# Patient Record
Sex: MALE | Race: WHITE | NOT HISPANIC OR LATINO | Employment: FULL TIME | URBAN - METROPOLITAN AREA
[De-identification: names, ages, dates, MRNs, and addresses within clinical notes are randomized per-mention and may not be internally consistent; named-entity substitution may affect disease eponyms.]

---

## 2020-11-17 ENCOUNTER — TELEPHONE (OUTPATIENT)
Dept: UROLOGY | Facility: CLINIC | Age: 65
End: 2020-11-17

## 2020-11-17 NOTE — TELEPHONE ENCOUNTER
----- Message from Karen Garcia sent at 11/17/2020  2:55 PM CST -----  Regarding: appt access  Type:  Sooner Apoointment Request    Caller is requesting a sooner appointment.  Caller declined first available appointment listed below.  Caller will not accept being placed on the waitlist and is requesting a message be sent to doctor.  Name of Caller:pt  When is the first available appointment?n/a  Symptoms:Check up  Would the patient rather a call back or a response via [facility]? Call back   Best Call Back Number:[phone]  Additional Information: Pt is looking to be scheduled in March. Please call back.Thanks

## 2021-02-04 ENCOUNTER — OFFICE VISIT (OUTPATIENT)
Dept: UROLOGY | Facility: CLINIC | Age: 66
End: 2021-02-04
Payer: COMMERCIAL

## 2021-02-04 ENCOUNTER — LAB VISIT (OUTPATIENT)
Dept: LAB | Facility: HOSPITAL | Age: 66
End: 2021-02-04
Attending: UROLOGY
Payer: COMMERCIAL

## 2021-02-04 VITALS
HEIGHT: 72 IN | BODY MASS INDEX: 31.92 KG/M2 | DIASTOLIC BLOOD PRESSURE: 86 MMHG | WEIGHT: 235.69 LBS | SYSTOLIC BLOOD PRESSURE: 126 MMHG

## 2021-02-04 DIAGNOSIS — N13.8 BPH WITH OBSTRUCTION/LOWER URINARY TRACT SYMPTOMS: Primary | ICD-10-CM

## 2021-02-04 DIAGNOSIS — N40.1 BPH WITH OBSTRUCTION/LOWER URINARY TRACT SYMPTOMS: ICD-10-CM

## 2021-02-04 DIAGNOSIS — N40.1 BPH WITH OBSTRUCTION/LOWER URINARY TRACT SYMPTOMS: Primary | ICD-10-CM

## 2021-02-04 DIAGNOSIS — N13.8 BPH WITH OBSTRUCTION/LOWER URINARY TRACT SYMPTOMS: ICD-10-CM

## 2021-02-04 PROBLEM — E78.5 HYPERLIPIDEMIA: Status: ACTIVE | Noted: 2021-02-04

## 2021-02-04 PROBLEM — J30.9 ALLERGIC RHINITIS: Status: ACTIVE | Noted: 2017-11-12

## 2021-02-04 PROBLEM — N20.0 KIDNEY STONE: Status: ACTIVE | Noted: 2021-02-04

## 2021-02-04 PROBLEM — J01.90 SINUSITIS, ACUTE: Status: ACTIVE | Noted: 2018-03-16

## 2021-02-04 PROBLEM — G43.109 MIGRAINE AURA WITHOUT HEADACHE: Status: ACTIVE | Noted: 2021-02-04

## 2021-02-04 PROBLEM — J06.9 URI, ACUTE: Status: ACTIVE | Noted: 2019-11-26

## 2021-02-04 PROBLEM — R31.9 HEMATURIA, UNSPECIFIED: Status: ACTIVE | Noted: 2020-09-14

## 2021-02-04 PROBLEM — I10 ESSENTIAL HYPERTENSION: Status: ACTIVE | Noted: 2019-07-30

## 2021-02-04 PROBLEM — G47.00 INSOMNIA: Status: ACTIVE | Noted: 2021-02-04

## 2021-02-04 PROBLEM — R10.9 ABDOMINAL PAIN: Status: ACTIVE | Noted: 2020-09-14

## 2021-02-04 LAB
BILIRUB SERPL-MCNC: NORMAL MG/DL
BLOOD URINE, POC: NORMAL
CLARITY, POC UA: CLEAR
COLOR, POC UA: YELLOW
GLUCOSE UR QL STRIP: NORMAL
KETONES UR QL STRIP: NORMAL
LEUKOCYTE ESTERASE URINE, POC: NORMAL
NITRITE, POC UA: NORMAL
PH, POC UA: 7
PROTEIN, POC: NORMAL
SPECIFIC GRAVITY, POC UA: 1.01
UROBILINOGEN, POC UA: NORMAL

## 2021-02-04 PROCEDURE — 1159F MED LIST DOCD IN RCRD: CPT | Mod: S$GLB,,, | Performed by: UROLOGY

## 2021-02-04 PROCEDURE — 36415 COLL VENOUS BLD VENIPUNCTURE: CPT | Mod: PO

## 2021-02-04 PROCEDURE — 3008F PR BODY MASS INDEX (BMI) DOCUMENTED: ICD-10-PCS | Mod: CPTII,S$GLB,, | Performed by: UROLOGY

## 2021-02-04 PROCEDURE — 99999 PR PBB SHADOW E&M-EST. PATIENT-LVL III: ICD-10-PCS | Mod: PBBFAC,,, | Performed by: UROLOGY

## 2021-02-04 PROCEDURE — 99212 OFFICE O/P EST SF 10 MIN: CPT | Mod: 25,S$GLB,, | Performed by: UROLOGY

## 2021-02-04 PROCEDURE — 1101F PR PT FALLS ASSESS DOC 0-1 FALLS W/OUT INJ PAST YR: ICD-10-PCS | Mod: CPTII,S$GLB,, | Performed by: UROLOGY

## 2021-02-04 PROCEDURE — 81002 POCT URINE DIPSTICK WITHOUT MICROSCOPE: ICD-10-PCS | Mod: S$GLB,,, | Performed by: UROLOGY

## 2021-02-04 PROCEDURE — 1101F PT FALLS ASSESS-DOCD LE1/YR: CPT | Mod: CPTII,S$GLB,, | Performed by: UROLOGY

## 2021-02-04 PROCEDURE — 1159F PR MEDICATION LIST DOCUMENTED IN MEDICAL RECORD: ICD-10-PCS | Mod: S$GLB,,, | Performed by: UROLOGY

## 2021-02-04 PROCEDURE — 3288F FALL RISK ASSESSMENT DOCD: CPT | Mod: CPTII,S$GLB,, | Performed by: UROLOGY

## 2021-02-04 PROCEDURE — 84153 ASSAY OF PSA TOTAL: CPT

## 2021-02-04 PROCEDURE — 3008F BODY MASS INDEX DOCD: CPT | Mod: CPTII,S$GLB,, | Performed by: UROLOGY

## 2021-02-04 PROCEDURE — 99999 PR PBB SHADOW E&M-EST. PATIENT-LVL III: CPT | Mod: PBBFAC,,, | Performed by: UROLOGY

## 2021-02-04 PROCEDURE — 99212 PR OFFICE/OUTPT VISIT, EST, LEVL II, 10-19 MIN: ICD-10-PCS | Mod: 25,S$GLB,, | Performed by: UROLOGY

## 2021-02-04 PROCEDURE — 3288F PR FALLS RISK ASSESSMENT DOCUMENTED: ICD-10-PCS | Mod: CPTII,S$GLB,, | Performed by: UROLOGY

## 2021-02-04 PROCEDURE — 81002 URINALYSIS NONAUTO W/O SCOPE: CPT | Mod: S$GLB,,, | Performed by: UROLOGY

## 2021-02-04 RX ORDER — ZOLPIDEM TARTRATE 10 MG/1
10 TABLET ORAL
COMMUNITY
Start: 2020-08-31

## 2021-02-04 RX ORDER — ASPIRIN 81 MG/1
81 TABLET ORAL
COMMUNITY

## 2021-02-04 RX ORDER — FENOFIBRATE 54 MG/1
TABLET ORAL
COMMUNITY
Start: 2021-01-12

## 2021-02-04 RX ORDER — LOSARTAN POTASSIUM AND HYDROCHLOROTHIAZIDE 12.5; 5 MG/1; MG/1
TABLET ORAL
COMMUNITY
Start: 2021-01-26

## 2021-02-04 RX ORDER — ROSUVASTATIN CALCIUM 20 MG/1
TABLET, COATED ORAL
COMMUNITY
Start: 2021-01-12

## 2021-02-04 RX ORDER — TESTOSTERONE 20.25 MG/1.25G
GEL TOPICAL
COMMUNITY
Start: 2020-12-24

## 2021-02-04 RX ORDER — CLOBETASOL PROPIONATE 0.46 MG/ML
SOLUTION TOPICAL
COMMUNITY
Start: 2021-01-04

## 2021-02-05 LAB — COMPLEXED PSA SERPL-MCNC: 0.51 NG/ML (ref 0–4)

## 2021-04-28 ENCOUNTER — PATIENT MESSAGE (OUTPATIENT)
Dept: RESEARCH | Facility: HOSPITAL | Age: 66
End: 2021-04-28

## 2021-05-10 PROBLEM — J01.90 SINUSITIS, ACUTE: Status: RESOLVED | Noted: 2018-03-16 | Resolved: 2021-05-10

## 2022-09-20 ENCOUNTER — OFFICE VISIT (OUTPATIENT)
Dept: UROLOGY | Facility: CLINIC | Age: 67
End: 2022-09-20
Payer: COMMERCIAL

## 2022-09-20 ENCOUNTER — LAB VISIT (OUTPATIENT)
Dept: LAB | Facility: HOSPITAL | Age: 67
End: 2022-09-20
Attending: UROLOGY
Payer: COMMERCIAL

## 2022-09-20 VITALS
DIASTOLIC BLOOD PRESSURE: 75 MMHG | SYSTOLIC BLOOD PRESSURE: 139 MMHG | WEIGHT: 237.88 LBS | BODY MASS INDEX: 32.26 KG/M2

## 2022-09-20 DIAGNOSIS — Z12.5 PROSTATE CANCER SCREENING: ICD-10-CM

## 2022-09-20 DIAGNOSIS — N13.8 BPH WITH OBSTRUCTION/LOWER URINARY TRACT SYMPTOMS: Primary | ICD-10-CM

## 2022-09-20 DIAGNOSIS — N40.1 BPH WITH OBSTRUCTION/LOWER URINARY TRACT SYMPTOMS: Primary | ICD-10-CM

## 2022-09-20 PROCEDURE — 36415 COLL VENOUS BLD VENIPUNCTURE: CPT | Mod: PN | Performed by: UROLOGY

## 2022-09-20 PROCEDURE — 1159F MED LIST DOCD IN RCRD: CPT | Mod: CPTII,S$GLB,, | Performed by: UROLOGY

## 2022-09-20 PROCEDURE — 99213 OFFICE O/P EST LOW 20 MIN: CPT | Mod: S$GLB,,, | Performed by: UROLOGY

## 2022-09-20 PROCEDURE — 3075F SYST BP GE 130 - 139MM HG: CPT | Mod: CPTII,S$GLB,, | Performed by: UROLOGY

## 2022-09-20 PROCEDURE — 1159F PR MEDICATION LIST DOCUMENTED IN MEDICAL RECORD: ICD-10-PCS | Mod: CPTII,S$GLB,, | Performed by: UROLOGY

## 2022-09-20 PROCEDURE — 99213 PR OFFICE/OUTPT VISIT, EST, LEVL III, 20-29 MIN: ICD-10-PCS | Mod: S$GLB,,, | Performed by: UROLOGY

## 2022-09-20 PROCEDURE — 3078F DIAST BP <80 MM HG: CPT | Mod: CPTII,S$GLB,, | Performed by: UROLOGY

## 2022-09-20 PROCEDURE — 3075F PR MOST RECENT SYSTOLIC BLOOD PRESS GE 130-139MM HG: ICD-10-PCS | Mod: CPTII,S$GLB,, | Performed by: UROLOGY

## 2022-09-20 PROCEDURE — 3008F PR BODY MASS INDEX (BMI) DOCUMENTED: ICD-10-PCS | Mod: CPTII,S$GLB,, | Performed by: UROLOGY

## 2022-09-20 PROCEDURE — 3078F PR MOST RECENT DIASTOLIC BLOOD PRESSURE < 80 MM HG: ICD-10-PCS | Mod: CPTII,S$GLB,, | Performed by: UROLOGY

## 2022-09-20 PROCEDURE — 84153 ASSAY OF PSA TOTAL: CPT | Performed by: UROLOGY

## 2022-09-20 PROCEDURE — 99999 PR PBB SHADOW E&M-EST. PATIENT-LVL III: CPT | Mod: PBBFAC,,, | Performed by: UROLOGY

## 2022-09-20 PROCEDURE — 3008F BODY MASS INDEX DOCD: CPT | Mod: CPTII,S$GLB,, | Performed by: UROLOGY

## 2022-09-20 PROCEDURE — 99999 PR PBB SHADOW E&M-EST. PATIENT-LVL III: ICD-10-PCS | Mod: PBBFAC,,, | Performed by: UROLOGY

## 2022-09-20 RX ORDER — TAMSULOSIN HYDROCHLORIDE 0.4 MG/1
0.4 CAPSULE ORAL DAILY
Qty: 30 CAPSULE | Refills: 11 | Status: SHIPPED | OUTPATIENT
Start: 2022-09-20 | End: 2022-11-29

## 2022-09-20 NOTE — PROGRESS NOTES
Chief Complaint   Patient presents with    Annual Exam         History of Present Illness:     9/20/22-has more frequency/urgency. Previously was on flomax, and it did help. No gross hematuria or dysuria. +weak stream.   2/4/21-Pt here for follow up. Never took cialis. Not having any urinary issues. No incontinence. Stream is not particularly good. If he drinks beer in the evening, he has nocturia, but switched to bourbon and denies nocturia. Has a little stranguria when he sits to urinate, better stream when he is standing.     Past  history:   BPH and urinary urgency  Flomax caused sexual side effects  Prescribed daily cialis 2/17/20    Review of Systems:   Constitutional: Pt denies fever, chills, change in appetite or unintentional weight loss  HENT: No drooling, loss of hearing, mouth sores, facial swelling  Eyes: No photophobia, visual disturbance or eye pain  Respiratory: No cough, dyspnea, wheezing  Cardiovascular: No chest pain, palpitations or leg swelling  GI: No constipation, diarrhea, nausea, vomiting, melena or hematochezia  : No genital lesions, discharge, nocturnal enuresis  Endocrine: No polydipsia, polyphagia, heat or cold intolerance  Musculoskeletal: No joint swelling, back pain or bone pain  Integument: No color change, rash or wounds  Neurological: No seizures, speech difficulty or tremors  Psychiatric: No confusion, self-harm or Hallucinations    Current Outpatient Medications   Medication Sig Dispense Refill    aspirin (ECOTRIN) 81 MG EC tablet Take 81 mg by mouth.      clobetasoL (TEMOVATE) 0.05 % external solution APPLY TO SCALP ONCE TO TWICE DAILY AS NEEDED FOR FLARES      fenofibrate (TRICOR) 54 MG tablet       losartan-hydrochlorothiazide 50-12.5 mg (HYZAAR) 50-12.5 mg per tablet       rosuvastatin (CRESTOR) 20 MG tablet       tamsulosin (FLOMAX) 0.4 mg Cap Take 1 capsule (0.4 mg total) by mouth once daily. 30 capsule 11    testosterone (ANDROGEL) 20.25 mg/1.25 gram (1.62 %) [facility]  APPLY 2 PUMPS TOPICALLY ONCE DAILY      zolpidem (AMBIEN) 10 mg Tab Take 10 mg by mouth.       No current facility-administered medications for this visit.       Review of patient's allergies indicates:  No Known Allergies    Past medical, family, and social history reviewed as documented in chart with pertinent positive medical, family, and social history detailed in HPI.    Physical Exam  Vitals:    09/20/22 1419   BP: 139/75       General: Well-developed, well-nourished, in no acute distress  HEENT: Normocephalic, atraumatic, extraocular eye movements in tact  Neck: supple, trachea midline, no cervical or supraclavicular adenopathy  Respirations: Even and unlabored  Rectal: 9/20/22-30gram prostate without nodules or tenderness. No gross blood.   Extremities: Moves all extremities equally, atraumatic, no clubbing, cyanosis, edema  Skin: warm and dry, no lesions  Psych: normal affect  Neuro: Alert and oriented x 3. Cranial nerves II-XII grossly intact    Urinalysis  Negative for LE, nit, blood        Assessment:   1. BPH with obstruction/lower urinary tract symptoms        2. Prostate cancer screening  PSA, Screening              Plan:  BPH with obstruction/lower urinary tract symptoms    Prostate cancer screening  -     PSA, Screening; Future; Expected date: 09/20/2022    Other orders  -     tamsulosin (FLOMAX) 0.4 mg Cap; Take 1 capsule (0.4 mg total) by mouth once daily.  Dispense: 30 capsule; Refill: 11        Follow up in about 1 year (around 9/20/2023).

## 2022-09-21 LAB — COMPLEXED PSA SERPL-MCNC: 0.82 NG/ML (ref 0–4)

## 2022-11-07 ENCOUNTER — PATIENT MESSAGE (OUTPATIENT)
Dept: UROLOGY | Facility: CLINIC | Age: 67
End: 2022-11-07
Payer: COMMERCIAL

## 2023-05-31 ENCOUNTER — PATIENT MESSAGE (OUTPATIENT)
Dept: RESEARCH | Facility: HOSPITAL | Age: 68
End: 2023-05-31
Payer: COMMERCIAL

## 2023-06-12 ENCOUNTER — PATIENT MESSAGE (OUTPATIENT)
Dept: RESEARCH | Facility: HOSPITAL | Age: 68
End: 2023-06-12
Payer: COMMERCIAL

## 2023-09-06 ENCOUNTER — OFFICE VISIT (OUTPATIENT)
Dept: UROLOGY | Facility: CLINIC | Age: 68
End: 2023-09-06
Payer: COMMERCIAL

## 2023-09-06 VITALS
HEART RATE: 97 BPM | DIASTOLIC BLOOD PRESSURE: 84 MMHG | WEIGHT: 234.13 LBS | SYSTOLIC BLOOD PRESSURE: 151 MMHG | HEIGHT: 72 IN | BODY MASS INDEX: 31.71 KG/M2

## 2023-09-06 DIAGNOSIS — N13.8 BPH WITH OBSTRUCTION/LOWER URINARY TRACT SYMPTOMS: Primary | ICD-10-CM

## 2023-09-06 DIAGNOSIS — N40.1 BPH WITH OBSTRUCTION/LOWER URINARY TRACT SYMPTOMS: Primary | ICD-10-CM

## 2023-09-06 DIAGNOSIS — R39.15 URINARY URGENCY: ICD-10-CM

## 2023-09-06 DIAGNOSIS — Z12.5 PROSTATE CANCER SCREENING: ICD-10-CM

## 2023-09-06 PROCEDURE — 99214 OFFICE O/P EST MOD 30 MIN: CPT | Mod: S$GLB,,, | Performed by: UROLOGY

## 2023-09-06 PROCEDURE — 3288F FALL RISK ASSESSMENT DOCD: CPT | Mod: CPTII,S$GLB,, | Performed by: UROLOGY

## 2023-09-06 PROCEDURE — 99214 PR OFFICE/OUTPT VISIT, EST, LEVL IV, 30-39 MIN: ICD-10-PCS | Mod: S$GLB,,, | Performed by: UROLOGY

## 2023-09-06 PROCEDURE — 3079F PR MOST RECENT DIASTOLIC BLOOD PRESSURE 80-89 MM HG: ICD-10-PCS | Mod: CPTII,S$GLB,, | Performed by: UROLOGY

## 2023-09-06 PROCEDURE — 99999 PR PBB SHADOW E&M-EST. PATIENT-LVL III: CPT | Mod: PBBFAC,,, | Performed by: UROLOGY

## 2023-09-06 PROCEDURE — 1101F PR PT FALLS ASSESS DOC 0-1 FALLS W/OUT INJ PAST YR: ICD-10-PCS | Mod: CPTII,S$GLB,, | Performed by: UROLOGY

## 2023-09-06 PROCEDURE — 3079F DIAST BP 80-89 MM HG: CPT | Mod: CPTII,S$GLB,, | Performed by: UROLOGY

## 2023-09-06 PROCEDURE — 3077F PR MOST RECENT SYSTOLIC BLOOD PRESSURE >= 140 MM HG: ICD-10-PCS | Mod: CPTII,S$GLB,, | Performed by: UROLOGY

## 2023-09-06 PROCEDURE — 3008F BODY MASS INDEX DOCD: CPT | Mod: CPTII,S$GLB,, | Performed by: UROLOGY

## 2023-09-06 PROCEDURE — 99999 PR PBB SHADOW E&M-EST. PATIENT-LVL III: ICD-10-PCS | Mod: PBBFAC,,, | Performed by: UROLOGY

## 2023-09-06 PROCEDURE — 3288F PR FALLS RISK ASSESSMENT DOCUMENTED: ICD-10-PCS | Mod: CPTII,S$GLB,, | Performed by: UROLOGY

## 2023-09-06 PROCEDURE — 3077F SYST BP >= 140 MM HG: CPT | Mod: CPTII,S$GLB,, | Performed by: UROLOGY

## 2023-09-06 PROCEDURE — 3008F PR BODY MASS INDEX (BMI) DOCUMENTED: ICD-10-PCS | Mod: CPTII,S$GLB,, | Performed by: UROLOGY

## 2023-09-06 PROCEDURE — 81003 POCT URINALYSIS, DIPSTICK, AUTOMATED, W/O SCOPE: ICD-10-PCS | Mod: QW,S$GLB,, | Performed by: UROLOGY

## 2023-09-06 PROCEDURE — 81003 URINALYSIS AUTO W/O SCOPE: CPT | Mod: QW,S$GLB,, | Performed by: UROLOGY

## 2023-09-06 PROCEDURE — 1101F PT FALLS ASSESS-DOCD LE1/YR: CPT | Mod: CPTII,S$GLB,, | Performed by: UROLOGY

## 2023-09-06 PROCEDURE — 1159F MED LIST DOCD IN RCRD: CPT | Mod: CPTII,S$GLB,, | Performed by: UROLOGY

## 2023-09-06 PROCEDURE — 1159F PR MEDICATION LIST DOCUMENTED IN MEDICAL RECORD: ICD-10-PCS | Mod: CPTII,S$GLB,, | Performed by: UROLOGY

## 2023-09-06 RX ORDER — TAMSULOSIN HYDROCHLORIDE 0.4 MG/1
0.4 CAPSULE ORAL DAILY
Qty: 90 CAPSULE | Refills: 3 | Status: SHIPPED | OUTPATIENT
Start: 2023-09-06

## 2023-09-06 NOTE — PROGRESS NOTES
Chief Complaint   Patient presents with    Other     Follow up         History of Present Illness:     9/6/23- having more urgency. Stream is weak. Taking flomax, but not working as well. No incontinence.   9/20/22-has more frequency/urgency. Previously was on flomax, and it did help. No gross hematuria or dysuria. +weak stream.   2/4/21-Pt here for follow up. Never took cialis. Not having any urinary issues. No incontinence. Stream is not particularly good. If he drinks beer in the evening, he has nocturia, but switched to bourbon and denies nocturia. Has a little stranguria when he sits to urinate, better stream when he is standing.     Past  history:   BPH and urinary urgency  Flomax caused sexual side effects  Prescribed daily cialis 2/17/20    Review of Systems:   Constitutional: Pt denies fever, chills, change in appetite or unintentional weight loss  HENT: No drooling, loss of hearing, mouth sores, facial swelling  Eyes: No photophobia, visual disturbance or eye pain  Respiratory: No cough, dyspnea, wheezing  Cardiovascular: No chest pain, palpitations or leg swelling  GI: No constipation, diarrhea, nausea, vomiting, melena or hematochezia  : No genital lesions, discharge, nocturnal enuresis  Endocrine: No polydipsia, polyphagia, heat or cold intolerance  Musculoskeletal: No joint swelling, back pain or bone pain  Integument: No color change, rash or wounds  Neurological: No seizures, speech difficulty or tremors  Psychiatric: No confusion, self-harm or Hallucinations    Current Outpatient Medications   Medication Sig Dispense Refill    aspirin (ECOTRIN) 81 MG EC tablet Take 81 mg by mouth.      clobetasoL (TEMOVATE) 0.05 % external solution APPLY TO SCALP ONCE TO TWICE DAILY AS NEEDED FOR FLARES      fenofibrate (TRICOR) 54 MG tablet       losartan-hydrochlorothiazide 50-12.5 mg (HYZAAR) 50-12.5 mg per tablet       rosuvastatin (CRESTOR) 20 MG tablet       testosterone (ANDROGEL) 20.25 mg/1.25 gram  (1.62 %) GlPm APPLY 2 PUMPS TOPICALLY ONCE DAILY      zolpidem (AMBIEN) 10 mg Tab Take 10 mg by mouth.      tamsulosin (FLOMAX) 0.4 mg Cap Take 1 capsule (0.4 mg total) by mouth once daily. 90 capsule 3     No current facility-administered medications for this visit.       Review of patient's allergies indicates:  No Known Allergies    Past medical, family, and social history reviewed as documented in chart with pertinent positive medical, family, and social history detailed in HPI.    Physical Exam  Vitals:    09/06/23 1607   BP: (!) 151/84   Pulse: 97       General: Well-developed, well-nourished, in no acute distress  HEENT: Normocephalic, atraumatic, extraocular eye movements in tact  Neck: supple, trachea midline, no cervical or supraclavicular adenopathy  Respirations: Even and unlabored  Rectal: 9/6/23-30gram prostate without nodules or tenderness. No gross blood.   Extremities: Moves all extremities equally, atraumatic, no clubbing, cyanosis, edema  Skin: warm and dry, no lesions  Psych: normal affect  Neuro: Alert and oriented x 3. Cranial nerves II-XII grossly intact    Urinalysis  Negative for LE, nit, blood        Assessment:   1. BPH with obstruction/lower urinary tract symptoms        2. Prostate cancer screening  PSA, Screening      3. Urinary urgency                  Plan:  BPH with obstruction/lower urinary tract symptoms    Prostate cancer screening  -     PSA, Screening; Future; Expected date: 09/06/2023    Urinary urgency    Other orders  -     tamsulosin (FLOMAX) 0.4 mg Cap; Take 1 capsule (0.4 mg total) by mouth once daily.  Dispense: 90 capsule; Refill: 3      Discussed further management options of OAB meds vs workup for possible surgical management, but pt not interested currently.       Follow up in about 1 year (around 9/6/2024).

## 2023-09-20 ENCOUNTER — LAB VISIT (OUTPATIENT)
Dept: LAB | Facility: HOSPITAL | Age: 68
End: 2023-09-20
Attending: UROLOGY
Payer: COMMERCIAL

## 2023-09-20 DIAGNOSIS — Z12.5 PROSTATE CANCER SCREENING: ICD-10-CM

## 2023-09-20 LAB — COMPLEXED PSA SERPL-MCNC: 0.78 NG/ML (ref 0–4)

## 2023-09-20 PROCEDURE — 36415 COLL VENOUS BLD VENIPUNCTURE: CPT | Mod: PN | Performed by: UROLOGY

## 2023-09-20 PROCEDURE — 84153 ASSAY OF PSA TOTAL: CPT | Performed by: UROLOGY

## 2023-11-01 LAB
BILIRUB UR QL STRIP: NEGATIVE
GLUCOSE UR QL STRIP: NEGATIVE
KETONES UR QL STRIP: POSITIVE
LEUKOCYTE ESTERASE UR QL STRIP: NEGATIVE
PH, POC UA: 5.5
POC BLOOD, URINE: NEGATIVE
POC NITRATES, URINE: NEGATIVE
PROT UR QL STRIP: NEGATIVE
SP GR UR STRIP: 1.03 (ref 1–1.03)
UROBILINOGEN UR STRIP-ACNC: 0.2 (ref 0.3–2.2)

## 2024-09-24 ENCOUNTER — OFFICE VISIT (OUTPATIENT)
Dept: UROLOGY | Facility: CLINIC | Age: 69
End: 2024-09-24
Payer: COMMERCIAL

## 2024-09-24 ENCOUNTER — PATIENT MESSAGE (OUTPATIENT)
Dept: UROLOGY | Facility: CLINIC | Age: 69
End: 2024-09-24

## 2024-09-24 VITALS
BODY MASS INDEX: 31.56 KG/M2 | TEMPERATURE: 98 F | WEIGHT: 233 LBS | SYSTOLIC BLOOD PRESSURE: 132 MMHG | HEIGHT: 72 IN | HEART RATE: 90 BPM | RESPIRATION RATE: 18 BRPM | DIASTOLIC BLOOD PRESSURE: 80 MMHG

## 2024-09-24 DIAGNOSIS — R39.15 URINARY URGENCY: ICD-10-CM

## 2024-09-24 DIAGNOSIS — N13.8 BPH WITH OBSTRUCTION/LOWER URINARY TRACT SYMPTOMS: Primary | ICD-10-CM

## 2024-09-24 DIAGNOSIS — N40.1 BPH WITH OBSTRUCTION/LOWER URINARY TRACT SYMPTOMS: Primary | ICD-10-CM

## 2024-09-24 LAB
BILIRUB UR QL STRIP: NEGATIVE
GLUCOSE UR QL STRIP: NEGATIVE
KETONES UR QL STRIP: NEGATIVE
LEUKOCYTE ESTERASE UR QL STRIP: NEGATIVE
PH, POC UA: 7
POC BLOOD, URINE: NEGATIVE
POC NITRATES, URINE: NEGATIVE
POC RESIDUAL URINE VOLUME: 30 ML (ref 0–100)
PROT UR QL STRIP: NEGATIVE
SP GR UR STRIP: 1.02 (ref 1–1.03)
UROBILINOGEN UR STRIP-ACNC: 0.2 (ref 0.3–2.2)

## 2024-09-24 PROCEDURE — 1126F AMNT PAIN NOTED NONE PRSNT: CPT | Mod: CPTII,S$GLB,, | Performed by: UROLOGY

## 2024-09-24 PROCEDURE — 3075F SYST BP GE 130 - 139MM HG: CPT | Mod: CPTII,S$GLB,, | Performed by: UROLOGY

## 2024-09-24 PROCEDURE — 3288F FALL RISK ASSESSMENT DOCD: CPT | Mod: CPTII,S$GLB,, | Performed by: UROLOGY

## 2024-09-24 PROCEDURE — 1159F MED LIST DOCD IN RCRD: CPT | Mod: CPTII,S$GLB,, | Performed by: UROLOGY

## 2024-09-24 PROCEDURE — 1101F PT FALLS ASSESS-DOCD LE1/YR: CPT | Mod: CPTII,S$GLB,, | Performed by: UROLOGY

## 2024-09-24 PROCEDURE — 51798 US URINE CAPACITY MEASURE: CPT | Mod: S$GLB,,, | Performed by: UROLOGY

## 2024-09-24 PROCEDURE — 3079F DIAST BP 80-89 MM HG: CPT | Mod: CPTII,S$GLB,, | Performed by: UROLOGY

## 2024-09-24 PROCEDURE — 81003 URINALYSIS AUTO W/O SCOPE: CPT | Mod: QW,S$GLB,, | Performed by: UROLOGY

## 2024-09-24 PROCEDURE — 99999 PR PBB SHADOW E&M-EST. PATIENT-LVL IV: CPT | Mod: PBBFAC,,, | Performed by: UROLOGY

## 2024-09-24 PROCEDURE — 3008F BODY MASS INDEX DOCD: CPT | Mod: CPTII,S$GLB,, | Performed by: UROLOGY

## 2024-09-24 PROCEDURE — 99214 OFFICE O/P EST MOD 30 MIN: CPT | Mod: S$GLB,,, | Performed by: UROLOGY

## 2024-09-24 RX ORDER — TAMSULOSIN HYDROCHLORIDE 0.4 MG/1
0.4 CAPSULE ORAL DAILY
Qty: 90 CAPSULE | Refills: 3 | Status: SHIPPED | OUTPATIENT
Start: 2024-09-24

## 2024-09-24 RX ORDER — AMOXICILLIN 500 MG
2 CAPSULE ORAL DAILY
COMMUNITY

## 2024-09-24 RX ORDER — MULTIVITAMIN
1 TABLET ORAL EVERY MORNING
COMMUNITY

## 2024-09-24 RX ORDER — DIAZEPAM 10 MG/1
TABLET ORAL
Qty: 1 TABLET | Refills: 0 | Status: SHIPPED | OUTPATIENT
Start: 2024-09-24

## 2024-09-24 RX ORDER — INDOMETHACIN 75 MG/1
75 CAPSULE, EXTENDED RELEASE ORAL
COMMUNITY
Start: 2023-11-02

## 2024-09-24 NOTE — PROGRESS NOTES
Chief Complaint   Patient presents with    Annual Exam     BPH follow up         History of Present Illness:     9/24/24-Taking flomax. He reports having urgency when he is flying, which makes him nervous. Also has post-void dribble, which bothers him. Stream is somewhat weak.    9/6/23- having more urgency. Stream is weak. Taking flomax, but not working as well. No incontinence.   9/20/22-has more frequency/urgency. Previously was on flomax, and it did help. No gross hematuria or dysuria. +weak stream.   2/4/21-Pt here for follow up. Never took cialis. Not having any urinary issues. No incontinence. Stream is not particularly good. If he drinks beer in the evening, he has nocturia, but switched to bourbon and denies nocturia. Has a little stranguria when he sits to urinate, better stream when he is standing.     Past  history:   BPH and urinary urgency  Flomax caused sexual side effects  Prescribed daily cialis 2/17/20    Review of Systems:   Constitutional: Pt denies fever, chills, change in appetite or unintentional weight loss  HENT: No drooling, loss of hearing, mouth sores, facial swelling  Eyes: No photophobia, visual disturbance or eye pain  Respiratory: No cough, dyspnea, wheezing  Cardiovascular: No chest pain, palpitations or leg swelling  GI: No constipation, diarrhea, nausea, vomiting, melena or hematochezia  : No genital lesions, discharge, nocturnal enuresis  Endocrine: No polydipsia, polyphagia, heat or cold intolerance  Musculoskeletal: No joint swelling, back pain or bone pain  Integument: No color change, rash or wounds  Neurological: No seizures, speech difficulty or tremors  Psychiatric: No confusion, self-harm or Hallucinations    Current Outpatient Medications   Medication Sig Dispense Refill    aspirin (ECOTRIN) 81 MG EC tablet Take 81 mg by mouth.      clobetasoL (TEMOVATE) 0.05 % external solution APPLY TO SCALP ONCE TO TWICE DAILY AS NEEDED FOR FLARES      fenofibrate (TRICOR) 54 MG  tablet       indomethacin (INDOCIN SR) 75 mg CpSR CR capsule Take 75 mg by mouth daily with breakfast.      losartan-hydrochlorothiazide 50-12.5 mg (HYZAAR) 50-12.5 mg per tablet       multivitamin (THERAGRAN) per tablet Take 1 tablet by mouth every morning.      omega-3 fatty acids/fish oil (FISH OIL-OMEGA-3 FATTY ACIDS) 300-1,000 mg capsule Take 2 capsules by mouth once daily.      rosuvastatin (CRESTOR) 20 MG tablet       testosterone (ANDROGEL) 20.25 mg/1.25 gram (1.62 %) GlPm APPLY 2 PUMPS TOPICALLY ONCE DAILY      zolpidem (AMBIEN) 10 mg Tab Take 10 mg by mouth.      diazePAM (VALIUM) 10 MG Tab Take 1 po 30 minutes before procedure. 1 tablet 0    tamsulosin (FLOMAX) 0.4 mg Cap Take 1 capsule (0.4 mg total) by mouth once daily. 90 capsule 3     No current facility-administered medications for this visit.       Review of patient's allergies indicates:  No Known Allergies    Past medical, family, and social history reviewed as documented in chart with pertinent positive medical, family, and social history detailed in HPI.    Physical Exam  Vitals:    09/24/24 0849   BP: 132/80   Pulse: 90   Resp: 18   Temp: 98.3 °F (36.8 °C)         General: Well-developed, well-nourished, in no acute distress  HEENT: Normocephalic, atraumatic, extraocular eye movements in tact  Neck: supple, trachea midline, no cervical or supraclavicular adenopathy  Respirations: Even and unlabored  Rectal: 9/6/23-30gram prostate without nodules or tenderness. No gross blood.   Extremities: Moves all extremities equally, atraumatic, no clubbing, cyanosis, edema  Skin: warm and dry, no lesions  Psych: normal affect  Neuro: Alert and oriented x 3. Cranial nerves II-XII grossly intact    PVR: 30mL    Urinalysis  Negative for LE, nit, blood    PSA  10/25/23: 0.9    Assessment:   1. BPH with obstruction/lower urinary tract symptoms  POCT Bladder Scan    POCT Urinalysis, Dipstick, Automated, W/O Scope    US Pelvis Complete Non OB      2. Urinary  urgency  POCT Bladder Scan    POCT Urinalysis, Dipstick, Automated, W/O Scope                  Plan:  BPH with obstruction/lower urinary tract symptoms  -     POCT Bladder Scan  -     POCT Urinalysis, Dipstick, Automated, W/O Scope  -     US Pelvis Complete Non OB; Future; Expected date: 09/24/2024    Urinary urgency  -     POCT Bladder Scan  -     POCT Urinalysis, Dipstick, Automated, W/O Scope    Other orders  -     diazePAM (VALIUM) 10 MG Tab; Take 1 po 30 minutes before procedure.  Dispense: 1 tablet; Refill: 0  -     tamsulosin (FLOMAX) 0.4 mg Cap; Take 1 capsule (0.4 mg total) by mouth once daily.  Dispense: 90 capsule; Refill: 3      Discussed UroLift in detail, and compared with TURP. We discussed expected side effects of each. Pt interested in intervention.       Follow up for Cystoscopy.

## 2024-10-15 ENCOUNTER — PROCEDURE VISIT (OUTPATIENT)
Dept: UROLOGY | Facility: CLINIC | Age: 69
End: 2024-10-15
Payer: COMMERCIAL

## 2024-10-15 ENCOUNTER — HOSPITAL ENCOUNTER (OUTPATIENT)
Dept: RADIOLOGY | Facility: HOSPITAL | Age: 69
Discharge: HOME | End: 2024-10-15
Attending: UROLOGY
Payer: COMMERCIAL

## 2024-10-15 VITALS
SYSTOLIC BLOOD PRESSURE: 110 MMHG | TEMPERATURE: 98 F | DIASTOLIC BLOOD PRESSURE: 71 MMHG | BODY MASS INDEX: 31.86 KG/M2 | WEIGHT: 235.25 LBS | HEIGHT: 72 IN | HEART RATE: 80 BPM | RESPIRATION RATE: 18 BRPM

## 2024-10-15 DIAGNOSIS — N13.8 BPH WITH OBSTRUCTION/LOWER URINARY TRACT SYMPTOMS: ICD-10-CM

## 2024-10-15 DIAGNOSIS — N13.8 BPH WITH OBSTRUCTION/LOWER URINARY TRACT SYMPTOMS: Primary | ICD-10-CM

## 2024-10-15 DIAGNOSIS — N40.1 BPH WITH OBSTRUCTION/LOWER URINARY TRACT SYMPTOMS: Primary | ICD-10-CM

## 2024-10-15 DIAGNOSIS — N40.1 BPH WITH OBSTRUCTION/LOWER URINARY TRACT SYMPTOMS: ICD-10-CM

## 2024-10-15 LAB
BILIRUB UR QL STRIP: NEGATIVE
GLUCOSE UR QL STRIP: NEGATIVE
KETONES UR QL STRIP: NEGATIVE
LEUKOCYTE ESTERASE UR QL STRIP: NEGATIVE
PH, POC UA: 7
POC BLOOD, URINE: NEGATIVE
POC NITRATES, URINE: NEGATIVE
PROT UR QL STRIP: NEGATIVE
SP GR UR STRIP: 1.02 (ref 1–1.03)
UROBILINOGEN UR STRIP-ACNC: 0.2 (ref 0.3–2.2)

## 2024-10-15 PROCEDURE — 99499 UNLISTED E&M SERVICE: CPT | Mod: S$GLB,,, | Performed by: UROLOGY

## 2024-10-15 PROCEDURE — 52000 CYSTOURETHROSCOPY: CPT | Mod: S$GLB,,, | Performed by: UROLOGY

## 2024-10-15 PROCEDURE — 76856 US EXAM PELVIC COMPLETE: CPT | Mod: TC,PN

## 2024-10-15 PROCEDURE — 76856 US EXAM PELVIC COMPLETE: CPT | Mod: 26,,, | Performed by: RADIOLOGY

## 2024-10-15 PROCEDURE — 81003 URINALYSIS AUTO W/O SCOPE: CPT | Mod: QW,S$GLB,, | Performed by: UROLOGY

## 2024-10-15 RX ORDER — LIDOCAINE HYDROCHLORIDE 20 MG/ML
JELLY TOPICAL
Status: COMPLETED | OUTPATIENT
Start: 2024-10-15 | End: 2024-10-15

## 2024-10-15 RX ORDER — CIPROFLOXACIN 500 MG/1
500 TABLET ORAL
Status: COMPLETED | OUTPATIENT
Start: 2024-10-15 | End: 2024-10-15

## 2024-10-15 RX ADMIN — CIPROFLOXACIN 500 MG: 500 TABLET ORAL at 11:10

## 2024-10-15 RX ADMIN — LIDOCAINE HYDROCHLORIDE 11 ML: 20 JELLY TOPICAL at 11:10

## 2024-10-15 NOTE — PROGRESS NOTES
.Patient came in for a cystoscopy, Ciprofloxacin 500 mg tab to be administered orally to help prevent infection. Confirmed patient's allergies, Ciprofloxacin 500 mg tab administered as ordered. Pt tolerated medication administration well. Patient agreed to wait 15 minutes after medication administration in the clinic and to report any adverse reactions.        James Amaya RN

## 2024-10-15 NOTE — H&P
Chief Complaint   Patient presents with    Procedure     Cystoscopy         History of Present Illness:     10/15/24: 34cc prostate by ultrasound. Here for cysto.   9/24/24-Taking flomax. He reports having urgency when he is flying, which makes him nervous. Also has post-void dribble, which bothers him. Stream is somewhat weak.    9/6/23- having more urgency. Stream is weak. Taking flomax, but not working as well. No incontinence.   9/20/22-has more frequency/urgency. Previously was on flomax, and it did help. No gross hematuria or dysuria. +weak stream.   2/4/21-Pt here for follow up. Never took cialis. Not having any urinary issues. No incontinence. Stream is not particularly good. If he drinks beer in the evening, he has nocturia, but switched to bourbon and denies nocturia. Has a little stranguria when he sits to urinate, better stream when he is standing.     Past  history:   BPH and urinary urgency  Flomax caused sexual side effects  Prescribed daily cialis 2/17/20    Review of Systems:   Constitutional: Pt denies fever, chills, change in appetite or unintentional weight loss  HENT: No drooling, loss of hearing, mouth sores, facial swelling  Eyes: No photophobia, visual disturbance or eye pain  Respiratory: No cough, dyspnea, wheezing  Cardiovascular: No chest pain, palpitations or leg swelling  GI: No constipation, diarrhea, nausea, vomiting, melena or hematochezia  : No genital lesions, discharge, nocturnal enuresis  Endocrine: No polydipsia, polyphagia, heat or cold intolerance  Musculoskeletal: No joint swelling, back pain or bone pain  Integument: No color change, rash or wounds  Neurological: No seizures, speech difficulty or tremors  Psychiatric: No confusion, self-harm or Hallucinations    Current Outpatient Medications   Medication Sig Dispense Refill    aspirin (ECOTRIN) 81 MG EC tablet Take 81 mg by mouth.      clobetasoL (TEMOVATE) 0.05 % external solution APPLY TO SCALP ONCE TO TWICE DAILY  AS NEEDED FOR FLARES      diazePAM (VALIUM) 10 MG Tab Take 1 po 30 minutes before procedure. 1 tablet 0    fenofibrate (TRICOR) 54 MG tablet       indomethacin (INDOCIN SR) 75 mg CpSR CR capsule Take 75 mg by mouth daily with breakfast.      losartan-hydrochlorothiazide 50-12.5 mg (HYZAAR) 50-12.5 mg per tablet       multivitamin (THERAGRAN) per tablet Take 1 tablet by mouth every morning.      omega-3 fatty acids/fish oil (FISH OIL-OMEGA-3 FATTY ACIDS) 300-1,000 mg capsule Take 2 capsules by mouth once daily.      rosuvastatin (CRESTOR) 20 MG tablet       tamsulosin (FLOMAX) 0.4 mg Cap Take 1 capsule (0.4 mg total) by mouth once daily. 90 capsule 3    testosterone (ANDROGEL) 20.25 mg/1.25 gram (1.62 %) GlPm APPLY 2 PUMPS TOPICALLY ONCE DAILY      zolpidem (AMBIEN) 10 mg Tab Take 10 mg by mouth.       No current facility-administered medications for this visit.       Review of patient's allergies indicates:  No Known Allergies    Past medical, family, and social history reviewed as documented in chart with pertinent positive medical, family, and social history detailed in HPI.    Physical Exam  Vitals:    10/15/24 1054   BP: 110/71   Pulse: 80   Resp: 18   Temp: 97.8 °F (36.6 °C)         General: Well-developed, well-nourished, in no acute distress  HEENT: Normocephalic, atraumatic, extraocular eye movements in tact  Neck: supple, trachea midline, no cervical or supraclavicular adenopathy  Respirations: Even and unlabored  Rectal: 9/6/23-30gram prostate without nodules or tenderness. No gross blood.   Extremities: Moves all extremities equally, atraumatic, no clubbing, cyanosis, edema  Skin: warm and dry, no lesions  Psych: normal affect  Neuro: Alert and oriented x 3. Cranial nerves II-XII grossly intact    PVR: 30mL    Urinalysis  Negative for LE, nit, blood    PSA  10/25/23: 0.9    Procedure: Cystoscopy      Procedure in detail:   Informed consent was obtained. The patient's genitalia was prepped and draped in  the usual sterile fashion. Lidocaine jelly was administered per urethra. I utilized the flexible cystoscope and advanced it into the urethral meatus. No urethral strictures were noted. Bladder access was obtained. Systematic review of the bladder was performed, noting no stones, foreign bodies or masses. Bilateral ureteral orifices were visualized and noted to be effluxing clear urine. There was mild trabeculation. Retroflexion was utilized, noting a small median lobe. The scope was slowly backed out of the urethra, and the prostate was noted to be laterally obstructing. No urethral lesions were identified. The patient tolerated the procedure well. Estimated blood loss was 0cc.       Assessment:   1. BPH with obstruction/lower urinary tract symptoms  POCT Urinalysis, Dipstick, Automated, W/O Scope            Plan:  BPH with obstruction/lower urinary tract symptoms  -     POCT Urinalysis, Dipstick, Automated, W/O Scope    Other orders  -     ciprofloxacin HCl tablet 500 mg  -     LIDOcaine HCl 2% urojet    Discussed surgical options. Discussed the difference between UroLift, including what to expect akash-operatively and in the long term. Pt interested in UroLift.   Pt to message when he wants to do UroLift

## 2024-10-16 DIAGNOSIS — N40.1 BPH WITH OBSTRUCTION/LOWER URINARY TRACT SYMPTOMS: Primary | ICD-10-CM

## 2024-10-16 DIAGNOSIS — N13.8 BPH WITH OBSTRUCTION/LOWER URINARY TRACT SYMPTOMS: Primary | ICD-10-CM

## 2024-10-16 RX ORDER — CIPROFLOXACIN 2 MG/ML
400 INJECTION, SOLUTION INTRAVENOUS
OUTPATIENT
Start: 2024-10-16

## 2024-10-17 ENCOUNTER — PRE-OP/PRE-PROCEDURE ORDERS (OUTPATIENT)
Dept: UROLOGY | Facility: CLINIC | Age: 69
End: 2024-10-17
Payer: COMMERCIAL

## 2024-10-17 ENCOUNTER — PATIENT MESSAGE (OUTPATIENT)
Dept: UROLOGY | Facility: CLINIC | Age: 69
End: 2024-10-17
Payer: COMMERCIAL

## 2024-10-17 DIAGNOSIS — Z01.818 PRE-OP EVALUATION: ICD-10-CM

## 2024-10-17 DIAGNOSIS — Z01.818 PRE-OP TESTING: ICD-10-CM

## 2024-12-26 ENCOUNTER — PATIENT MESSAGE (OUTPATIENT)
Dept: PREADMISSION TESTING | Facility: HOSPITAL | Age: 69
End: 2024-12-26
Payer: COMMERCIAL

## 2024-12-27 ENCOUNTER — CLINICAL SUPPORT (OUTPATIENT)
Dept: UROLOGY | Facility: CLINIC | Age: 69
End: 2024-12-27
Payer: COMMERCIAL

## 2024-12-27 ENCOUNTER — TELEPHONE (OUTPATIENT)
Dept: UROLOGY | Facility: CLINIC | Age: 69
End: 2024-12-27

## 2024-12-27 DIAGNOSIS — Z01.818 PRE-OP EVALUATION: Primary | ICD-10-CM

## 2024-12-27 NOTE — TELEPHONE ENCOUNTER
.Outgoing call placed to patient, patient verified name and date of birth, patient stated he was a little confused when he saw an EKG on this pre-op testing, and the address but he figured it out and no further assistance needed.       ----- Message from Leydi sent at 12/27/2024 11:02 AM CST -----  Name of Who is Calling:Pt         What is the request in detail:Pt would like a call back in regards to jake appt . Please advise thank you         Can the clinic reply by [facility]:no        What Number to Call Back if not in [facility]:Telephone Information:  Mobile          [phone]

## 2024-12-30 ENCOUNTER — HOSPITAL ENCOUNTER (OUTPATIENT)
Dept: RADIOLOGY | Facility: HOSPITAL | Age: 69
Discharge: HOME | End: 2024-12-30
Attending: UROLOGY
Payer: COMMERCIAL

## 2024-12-30 ENCOUNTER — HOSPITAL ENCOUNTER (OUTPATIENT)
Dept: CARDIOLOGY | Facility: HOSPITAL | Age: 69
Discharge: HOME | End: 2024-12-30
Attending: UROLOGY
Payer: COMMERCIAL

## 2024-12-30 DIAGNOSIS — Z01.818 PRE-OP EVALUATION: ICD-10-CM

## 2024-12-30 DIAGNOSIS — Z01.818 PRE-OP TESTING: ICD-10-CM

## 2024-12-30 LAB
OHS QRS DURATION: 70 MS
OHS QTC CALCULATION: 421 MS

## 2024-12-30 PROCEDURE — 71046 X-RAY EXAM CHEST 2 VIEWS: CPT | Mod: TC,FY,PO

## 2024-12-30 PROCEDURE — 93005 ELECTROCARDIOGRAM TRACING: CPT | Mod: PO | Performed by: INTERNAL MEDICINE

## 2024-12-30 PROCEDURE — 93010 ELECTROCARDIOGRAM REPORT: CPT | Mod: ,,, | Performed by: INTERNAL MEDICINE

## 2024-12-30 PROCEDURE — 71046 X-RAY EXAM CHEST 2 VIEWS: CPT | Mod: 26,,, | Performed by: RADIOLOGY

## 2024-12-31 ENCOUNTER — ANESTHESIA EVENT (OUTPATIENT)
Dept: SURGERY | Facility: HOSPITAL | Age: 69
End: 2024-12-31
Payer: COMMERCIAL

## 2024-12-31 NOTE — ANESTHESIA PREPROCEDURE EVALUATION
12/31/2024  He Byers is a 69 y.o., male.  Past Medical History:   Diagnosis Date    Hypertension      No past surgical history on file.      Pre-op Assessment    I have reviewed the Patient Summary Reports.    I have reviewed the NPO Status.   I have reviewed the Medications.     Review of Systems  Anesthesia Hx:   Neg history of prior surgery.            Denies Personal Hx of Anesthesia complications.                    Cardiovascular:     Hypertension           hyperlipidemia                         Hypertension         Pulmonary:  Pulmonary Normal                       Renal/:  Chronic Renal Disease renal calculi BPH      Kidney Function/Disease             Neurological:      Headaches      Dx of Headaches                           Endocrine:  Endocrine Normal                Physical Exam  General: Well nourished    Airway:  Mallampati: III   Mouth Opening: Small, but > 3cm  TM Distance: Normal  Tongue: Normal  Neck ROM: Normal ROM    Dental:  Intact        Anesthesia Plan  Type of Anesthesia, risks & benefits discussed:    Anesthesia Type: Gen ETT, Gen Supraglottic Airway  Intra-op Monitoring Plan: Standard ASA Monitors  Post Op Pain Control Plan: multimodal analgesia and IV/PO Opioids PRN  Induction:  IV  Informed Consent: Informed consent signed with the Patient and all parties understand the risks and agree with anesthesia plan.  All questions answered. Patient consented to blood products? No  ASA Score: 3  Day of Surgery Review of History & Physical: H&P Update referred to the surgeon/provider.    Ready For Surgery From Anesthesia Perspective.     .

## 2025-01-03 ENCOUNTER — PATIENT MESSAGE (OUTPATIENT)
Dept: RESPIRATORY THERAPY | Facility: HOSPITAL | Age: 70
End: 2025-01-03
Payer: COMMERCIAL

## 2025-01-06 ENCOUNTER — ANESTHESIA (OUTPATIENT)
Dept: SURGERY | Facility: HOSPITAL | Age: 70
End: 2025-01-06
Payer: COMMERCIAL

## 2025-01-06 ENCOUNTER — HOSPITAL ENCOUNTER (OUTPATIENT)
Facility: HOSPITAL | Age: 70
Discharge: HOME | End: 2025-01-06
Attending: UROLOGY | Admitting: UROLOGY
Payer: COMMERCIAL

## 2025-01-06 VITALS
DIASTOLIC BLOOD PRESSURE: 67 MMHG | RESPIRATION RATE: 10 BRPM | BODY MASS INDEX: 31.95 KG/M2 | OXYGEN SATURATION: 95 % | WEIGHT: 235.88 LBS | SYSTOLIC BLOOD PRESSURE: 134 MMHG | HEIGHT: 72 IN | HEART RATE: 57 BPM | TEMPERATURE: 98 F

## 2025-01-06 DIAGNOSIS — N40.1 BPH WITH OBSTRUCTION/LOWER URINARY TRACT SYMPTOMS: Primary | ICD-10-CM

## 2025-01-06 DIAGNOSIS — N13.8 BPH WITH OBSTRUCTION/LOWER URINARY TRACT SYMPTOMS: Primary | ICD-10-CM

## 2025-01-06 PROCEDURE — 71000033 HC RECOVERY, INTIAL HOUR: Performed by: UROLOGY

## 2025-01-06 PROCEDURE — 37000008 HC ANESTHESIA 1ST 15 MINUTES: Performed by: UROLOGY

## 2025-01-06 PROCEDURE — 52441 CYSTO INSJ TRNSPRSTC 1 IMPLT: CPT | Mod: ,,, | Performed by: UROLOGY

## 2025-01-06 PROCEDURE — 37000009 HC ANESTHESIA EA ADD 15 MINS: Performed by: UROLOGY

## 2025-01-06 PROCEDURE — 52442 CYSTO INS TRNSPRSTC IMPLT EA: CPT | Mod: ,,, | Performed by: UROLOGY

## 2025-01-06 PROCEDURE — 63600175 PHARM REV CODE 636 W HCPCS: Performed by: UROLOGY

## 2025-01-06 PROCEDURE — 36000707: Performed by: UROLOGY

## 2025-01-06 PROCEDURE — D9220A PRA ANESTHESIA: Mod: ,,, | Performed by: NURSE ANESTHETIST, CERTIFIED REGISTERED

## 2025-01-06 PROCEDURE — 71000015 HC POSTOP RECOV 1ST HR: Performed by: UROLOGY

## 2025-01-06 PROCEDURE — 36000706: Performed by: UROLOGY

## 2025-01-06 PROCEDURE — 63600175 PHARM REV CODE 636 W HCPCS: Performed by: NURSE ANESTHETIST, CERTIFIED REGISTERED

## 2025-01-06 PROCEDURE — L8699 PROSTHETIC IMPLANT NOS: HCPCS | Performed by: UROLOGY

## 2025-01-06 DEVICE — CARTRIDGE UROLIFT 2 IMPLANT: Type: IMPLANTABLE DEVICE | Site: URETHRA | Status: FUNCTIONAL

## 2025-01-06 RX ORDER — ACETAMINOPHEN 10 MG/ML
INJECTION, SOLUTION INTRAVENOUS
Status: DISCONTINUED | OUTPATIENT
Start: 2025-01-06 | End: 2025-01-06

## 2025-01-06 RX ORDER — METHYLPREDNISOLONE 4 MG/1
TABLET ORAL
Qty: 21 EACH | Refills: 0 | Status: SHIPPED | OUTPATIENT
Start: 2025-01-06 | End: 2025-01-27

## 2025-01-06 RX ORDER — CIPROFLOXACIN 2 MG/ML
400 INJECTION, SOLUTION INTRAVENOUS
Status: COMPLETED | OUTPATIENT
Start: 2025-01-06 | End: 2025-01-06

## 2025-01-06 RX ORDER — GLUCAGON 1 MG
1 KIT INJECTION
Status: DISCONTINUED | OUTPATIENT
Start: 2025-01-06 | End: 2025-01-06 | Stop reason: HOSPADM

## 2025-01-06 RX ORDER — PROPOFOL 10 MG/ML
VIAL (ML) INTRAVENOUS
Status: DISCONTINUED | OUTPATIENT
Start: 2025-01-06 | End: 2025-01-06

## 2025-01-06 RX ORDER — MEPERIDINE HYDROCHLORIDE 25 MG/ML
12.5 INJECTION INTRAMUSCULAR; INTRAVENOUS; SUBCUTANEOUS ONCE AS NEEDED
Status: DISCONTINUED | OUTPATIENT
Start: 2025-01-06 | End: 2025-01-06 | Stop reason: HOSPADM

## 2025-01-06 RX ORDER — ONDANSETRON HYDROCHLORIDE 2 MG/ML
INJECTION, SOLUTION INTRAMUSCULAR; INTRAVENOUS
Status: DISCONTINUED | OUTPATIENT
Start: 2025-01-06 | End: 2025-01-06

## 2025-01-06 RX ORDER — ONDANSETRON HYDROCHLORIDE 2 MG/ML
4 INJECTION, SOLUTION INTRAVENOUS DAILY PRN
Status: DISCONTINUED | OUTPATIENT
Start: 2025-01-06 | End: 2025-01-06 | Stop reason: HOSPADM

## 2025-01-06 RX ORDER — FENTANYL CITRATE 50 UG/ML
INJECTION, SOLUTION INTRAMUSCULAR; INTRAVENOUS
Status: DISCONTINUED | OUTPATIENT
Start: 2025-01-06 | End: 2025-01-06

## 2025-01-06 RX ORDER — MIDAZOLAM HYDROCHLORIDE 1 MG/ML
INJECTION INTRAMUSCULAR; INTRAVENOUS
Status: DISCONTINUED | OUTPATIENT
Start: 2025-01-06 | End: 2025-01-06

## 2025-01-06 RX ORDER — FENTANYL CITRATE 50 UG/ML
25 INJECTION, SOLUTION INTRAMUSCULAR; INTRAVENOUS EVERY 5 MIN PRN
Status: DISCONTINUED | OUTPATIENT
Start: 2025-01-06 | End: 2025-01-06 | Stop reason: HOSPADM

## 2025-01-06 RX ORDER — HYDROCODONE BITARTRATE AND ACETAMINOPHEN 10; 325 MG/1; MG/1
1 TABLET ORAL EVERY 6 HOURS PRN
Qty: 6 TABLET | Refills: 0 | Status: SHIPPED | OUTPATIENT
Start: 2025-01-06

## 2025-01-06 RX ORDER — LIDOCAINE HYDROCHLORIDE 20 MG/ML
JELLY TOPICAL
Status: DISCONTINUED
Start: 2025-01-06 | End: 2025-01-06 | Stop reason: WASHOUT

## 2025-01-06 RX ORDER — CIPROFLOXACIN 2 MG/ML
INJECTION, SOLUTION INTRAVENOUS
Status: COMPLETED
Start: 2025-01-06 | End: 2025-01-06

## 2025-01-06 RX ORDER — OXYCODONE AND ACETAMINOPHEN 5; 325 MG/1; MG/1
1 TABLET ORAL
Status: DISCONTINUED | OUTPATIENT
Start: 2025-01-06 | End: 2025-01-06 | Stop reason: HOSPADM

## 2025-01-06 RX ORDER — LIDOCAINE HYDROCHLORIDE 20 MG/ML
INJECTION, SOLUTION EPIDURAL; INFILTRATION; INTRACAUDAL; PERINEURAL
Status: DISCONTINUED | OUTPATIENT
Start: 2025-01-06 | End: 2025-01-06

## 2025-01-06 RX ORDER — DEXMEDETOMIDINE HYDROCHLORIDE 100 UG/ML
INJECTION, SOLUTION INTRAVENOUS
Status: DISCONTINUED | OUTPATIENT
Start: 2025-01-06 | End: 2025-01-06

## 2025-01-06 RX ORDER — DEXAMETHASONE SODIUM PHOSPHATE 4 MG/ML
INJECTION, SOLUTION INTRA-ARTICULAR; INTRALESIONAL; INTRAMUSCULAR; INTRAVENOUS; SOFT TISSUE
Status: DISCONTINUED | OUTPATIENT
Start: 2025-01-06 | End: 2025-01-06

## 2025-01-06 RX ADMIN — FENTANYL CITRATE 50 MCG: 0.05 INJECTION, SOLUTION INTRAMUSCULAR; INTRAVENOUS at 01:01

## 2025-01-06 RX ADMIN — LIDOCAINE HYDROCHLORIDE 50 MG: 20 INJECTION, SOLUTION EPIDURAL; INFILTRATION; INTRACAUDAL; PERINEURAL at 01:01

## 2025-01-06 RX ADMIN — PROPOFOL 200 MG: 10 INJECTION, EMULSION INTRAVENOUS at 01:01

## 2025-01-06 RX ADMIN — MIDAZOLAM 2 MG: 1 INJECTION INTRAMUSCULAR; INTRAVENOUS at 01:01

## 2025-01-06 RX ADMIN — SODIUM CHLORIDE, POTASSIUM CHLORIDE, SODIUM LACTATE AND CALCIUM CHLORIDE: 600; 310; 30; 20 INJECTION, SOLUTION INTRAVENOUS at 01:01

## 2025-01-06 RX ADMIN — ONDANSETRON 4 MG: 2 INJECTION INTRAMUSCULAR; INTRAVENOUS at 01:01

## 2025-01-06 RX ADMIN — DEXAMETHASONE SODIUM PHOSPHATE 4 MG: 4 INJECTION, SOLUTION INTRA-ARTICULAR; INTRALESIONAL; INTRAMUSCULAR; INTRAVENOUS; SOFT TISSUE at 01:01

## 2025-01-06 RX ADMIN — ACETAMINOPHEN 1000 MG: 10 INJECTION, SOLUTION INTRAVENOUS at 01:01

## 2025-01-06 RX ADMIN — CIPROFLOXACIN 400 MG: 2 INJECTION, SOLUTION INTRAVENOUS at 01:01

## 2025-01-06 RX ADMIN — DEXMEDETOMIDINE HYDROCHLORIDE 4 MCG: 100 INJECTION, SOLUTION INTRAVENOUS at 01:01

## 2025-01-06 NOTE — DISCHARGE SUMMARY
[facility]  Discharge Note  Short Stay    Procedure(s) (LRB):  CYSTOSCOPY, WITH INSERTION OF UROLIFT IMPLANT (N/A)      OUTCOME: Patient tolerated treatment/procedure well without complication and is now ready for discharge.    DISPOSITION: Home or Self Care    FINAL DIAGNOSIS:  BPH with obstruction/lower urinary tract symptoms    FOLLOWUP: In clinic    DISCHARGE INSTRUCTIONS:    Discharge Procedure Orders   Diet Adult Regular     Notify your health care provider if you experience any of the following:  temperature >100.4     Notify your health care provider if you experience any of the following:  persistent nausea and vomiting or diarrhea     Notify your health care provider if you experience any of the following:  severe uncontrolled pain     No dressing needed     Other restrictions (specify):   Order Comments: No vigorous activity for a week.        TIME SPENT ON DISCHARGE: 10 minutes

## 2025-01-06 NOTE — H&P
CC: UroLift    History of Present Illness:   He Byers is a 69 y.o. male here for UroLift    1/6/25-Here for uroLift. No interval change in H&P.   10/15/24: 34cc prostate by ultrasound. Here for cysto.   9/24/24-Taking flomax. He reports having urgency when he is flying, which makes him nervous. Also has post-void dribble, which bothers him. Stream is somewhat weak.    9/6/23- having more urgency. Stream is weak. Taking flomax, but not working as well. No incontinence.   9/20/22-has more frequency/urgency. Previously was on flomax, and it did help. No gross hematuria or dysuria. +weak stream.   2/4/21-Pt here for follow up. Never took cialis. Not having any urinary issues. No incontinence. Stream is not particularly good. If he drinks beer in the evening, he has nocturia, but switched to bourbon and denies nocturia. Has a little stranguria when he sits to urinate, better stream when he is standing.      Past  history:   BPH and urinary urgency  Flomax caused sexual side effects  Prescribed daily cialis 2/17/20      Past Medical History:   Diagnosis Date    Hypertension        Past Surgical History:   Procedure Laterality Date    BUNIONECTOMY Left     CIRCUMCISION      SHOULDER ARTHROSCOPY Bilateral        No family history on file.    Social History     Tobacco Use    Smoking status: Never    Smokeless tobacco: Never   Substance Use Topics    Alcohol use: Yes     Comment: socially    Drug use: Yes     Types: Marijuana     Comment: smoke on occasion       Current Facility-Administered Medications   Medication Dose Route Frequency Provider Last Rate Last Admin    ciprofloxacin (CIPRO)400mg/200ml D5W IVPB (CIPRO) 400 mg/200 mL             ciprofloxacin (CIPRO)400mg/200ml D5W IVPB 400 mg  400 mg Intravenous On Call Procedure Kaycee Lou MD        LIDOcaine HCl 2% (XYLOCAINE) 2 % urojet                Review of patient's allergies indicates:  No Known Allergies    Physical Exam  Vitals:    01/06/25  1139   BP: (!) 155/74   Pulse: 67   Resp: 16   Temp: 98 °F (36.7 °C)       General: Well-developed, well-nourished in no acute distress  HEENT: Normocephalic, atraumatic, Extraocular movements intact  Neck: supple, trachea midline, no cervical or supraclavicular lymphadenopathy  Respirations: even and unlabored  Back: midline spine, no CVA tenderness  Extremities: atraumatic, moves all equally, no clubbing, cyanosis or edema  Psych: normal affect  Skin: warm and dry, no lesions  Neuro: Alert and oriented, Cranial nerves II-XII grossly intact    Urinalysis  pH, UA   Date Value Ref Range Status   10/15/2024 7.0  Final     Nitrite, UA   Date Value Ref Range Status   02/04/2021 neg  Final         Lab Results   Component Value Date    PSA 0.78 09/20/2023    PSA 0.82 09/20/2022       Testosterone, Total   Date/Time Value Ref Range Status   08/04/2021 09:28  264 - 916 ng/dL Final     Comment:     Adult male reference interval is based on a population of  healthy nonobese males (BMI <30) between 19 and 39 years old.  jad Vo.al. JCEM 2017,102;1276-0236. PMID: [id number].       Assessment:   BPH with obstruction    Plan:  BPH with obstruction/lower urinary tract symptoms  -     Place in Outpatient; Standing  -     Diet NPO; Standing  -     Place sequential compression device; Standing    Other orders  -     IP VTE LOW RISK PATIENT; Standing  -     ciprofloxacin (CIPRO)400mg/200ml D5W IVPB 400 mg  -     LIDOcaine HCl 2% (XYLOCAINE) 2 % urojet  -     ciprofloxacin (CIPRO)400mg/200ml D5W IVPB (CIPRO) 400 mg/200 mL  -     Admit to Phase 1 PACU, transfer to Phase 2 per protocol when indicated ; Standing  -     Vital signs; Standing  -     fentaNYL 50 mcg/mL injection 25 mcg  -     oxyCODONE-acetaminophen 5-325 mg per tablet 1 tablet  -     meperidine (PF) injection 12.5 mg  -     ondansetron injection 4 mg  -     dextrose 50% injection 12.5 g  -     glucagon (human recombinant) injection 1 mg  -     Re-check Blood  Glucose; Standing  -     Apply warming blanket; Standing  -     Notify Physician - Potential Need of Opioid Reversal; Standing  -     Oxygen Continuous; Standing  -     Pulse Oximetry Continuous; Standing      Risks/benefits discussed, uroLift today.

## 2025-01-06 NOTE — OP NOTE
Operative Note       Pre-op Diagnosis:  BPH with obstruction/lower urinary tract symptoms [N40.1, N13.8]    Post-op Diagnosis: Post-Op Diagnosis Codes:     * BPH with obstruction/lower urinary tract symptoms [N40.1, N13.8]    Surgery Date: 1/6/2025     Surgeon: Kaycee Lou MD    Procedures:  Prostatic Urethral Lift (Urolift)    Anesthesia: General    Estimated Blood Loss: 10cc         Specimens Removed:   Specimen (24h ago, onward)      None            Implants:   Implant Name Type Inv. Item Serial No.  Lot No. LRB No. Used Action   CARTRIDGE UROLIFT 2 IMPLANT - [id number]  CARTRIDGE UROLIFT 2 IMPLANT  [employer] [id number] N/A 2 Implanted   CARTRIDGE UROLIFT 2 IMPLANT - [id number]  CARTRIDGE UROLIFT 2 IMPLANT  [employer] [id number] N/A 1 Implanted   CARTRIDGE UROLIFT 2 IMPLANT - [id number]  CARTRIDGE UROLIFT 2 IMPLANT  [employer] TECH [id number] N/A 3 Implanted       Findings: open anterior channel at the conclusion of the case    Drains: none    Complications: No    Indications for Procedure:  The patient is a 69 y.o. year old male with BPH with REED and LUTS.  He presents today for surgical treatment with prostatic urethral lift (Urolift).  The full risks and benefits of the procedure have been explained and detail and he wishes to proceed.    Procedure Description:  The patient was brought to the operative suite and placed under General anesthesia. He was placed in lithotomy position and prepped and draped in usual sterile fashion.  Time out was performed prior to starting the procedure.    A 20F cystoscope was inserted into the bladder. The cystoscopy bridge was replaced with a UroLift delivery device. The first treatment site was the patient's left side approximately 2cm distal to the bladder neck. The distal tip of the delivery device was then angled laterally approximately 20 degrees at this position to compress the lateral lobe. The trigger was pulled, thereby deploying a needle containing the implant  through the prostate. The needle was then retracted, allowing one end of the implant to be delivered to the capsular surface of the prostate. The implant was then tensioned to assure capsular seating and removal of slack monofilament. The device was then angled back toward midline and slowly advanced proximally (typically 3 to 4 mm) until cystoscopic verification of the monofilament being centered in the delivery bay. The urethral end piece was then affixed to the monofilament thereby tailoring the size of the implant. Excess filament was then severed. The delivery device was then re-advanced into the bladder. The delivery device was then replaced and the same procedure was then repeated on the right side.    The delivery device was then replaced with cystoscope and bridge and the implant location and opening effect was confirmed cystoscopically. Two additional implants were delivered just proximal to the veru montanum, again one on right and one on left side of the prostate, following the same technique.     Cystoscopy then revealed a persistent area of obstruction, and two more implants were delivered in the mid prostate.     A final cystoscopy was conducted first to inspect the location and state of each implant and second, to confirm the presence of a continuous anterior channel was present through the prostatic urethra with irrigation flow turned off. There was mild bleeding. I placed a 20fr rosales with 10cc in the rosales balloon. The bladder was then filled with approximately 100 cc irrigation fluid to assist the patient in void trial after the procedure, and a catheter plug was placed. All instruments were removed.     Disposition: PACU - hemodynamically stable.      Kaycee Lou MD

## 2025-01-06 NOTE — TRANSFER OF CARE
Anesthesia Transfer of Care Note    Patient: He Byers    Procedure(s) Performed: Procedure(s) (LRB):  CYSTOSCOPY, WITH INSERTION OF UROLIFT IMPLANT (N/A)    Patient location: PACU    Anesthesia Type: general    Transport from OR: Transported from OR on room air with adequate spontaneous ventilation    Post pain: adequate analgesia    Post assessment: no apparent anesthetic complications    Post vital signs: stable    Level of consciousness: awake    Complications: none    Transfer of care protocol was followed      Last vitals: Visit Vitals  BP (!) 155/74 (BP Location: Right arm, Patient Position: Sitting)   Pulse 67   Temp 36.7 °C (98 °F)   Resp 16   Ht 6' (1.829 m)   Wt 107 kg (235 lb 14.3 oz)   SpO2 (!) 94%   BMI 31.99 kg/m²

## 2025-01-06 NOTE — ANESTHESIA POSTPROCEDURE EVALUATION
Anesthesia Post Evaluation    Patient: He Byers    Procedure(s) Performed: Procedure(s) (LRB):  CYSTOSCOPY, WITH INSERTION OF UROLIFT IMPLANT (N/A)    Final Anesthesia Type: general      Patient location during evaluation: PACU  Patient participation: Yes- Able to Participate  Level of consciousness: awake  Post-procedure vital signs: reviewed and stable  Pain management: adequate  Airway patency: patent    PONV status at discharge: No PONV  Anesthetic complications: no      Cardiovascular status: stable  Respiratory status: unassisted  Hydration status: euvolemic  Follow-up not needed.              Vitals Value Taken Time   /58 01/06/25 1414   Temp 36.5 °C (97.7 °F) 01/06/25 1351   Pulse 63 01/06/25 1414   Resp 13 01/06/25 1414   SpO2 94 % 01/06/25 1414   Vitals shown include unfiled device data.      No case tracking events are documented in the log.      Pain/Kyler Score: No data recorded

## 2025-01-07 ENCOUNTER — TELEPHONE (OUTPATIENT)
Dept: UROLOGY | Facility: CLINIC | Age: 70
End: 2025-01-07
Payer: COMMERCIAL

## 2025-01-07 NOTE — TELEPHONE ENCOUNTER
.Outgoing call placed to patient, patient verified name and date of birth, patient stated he is in pain but it has improved, notified of recommended post op appt, verbalized understanding and appt scheduled, no further assistance needed.     ----- Message from Kaycee Lou MD sent at 1/6/2025  1:59 PM CST -----  Please schedule pt for a 3 week post-op

## 2025-02-04 ENCOUNTER — OFFICE VISIT (OUTPATIENT)
Dept: UROLOGY | Facility: CLINIC | Age: 70
End: 2025-02-04
Payer: COMMERCIAL

## 2025-02-04 VITALS
HEIGHT: 72 IN | RESPIRATION RATE: 18 BRPM | SYSTOLIC BLOOD PRESSURE: 119 MMHG | BODY MASS INDEX: 32.1 KG/M2 | DIASTOLIC BLOOD PRESSURE: 77 MMHG | HEART RATE: 87 BPM | WEIGHT: 237 LBS

## 2025-02-04 DIAGNOSIS — N40.1 BPH WITH OBSTRUCTION/LOWER URINARY TRACT SYMPTOMS: Primary | ICD-10-CM

## 2025-02-04 DIAGNOSIS — N13.8 BPH WITH OBSTRUCTION/LOWER URINARY TRACT SYMPTOMS: Primary | ICD-10-CM

## 2025-02-04 LAB
BILIRUB UR QL STRIP: NEGATIVE
GLUCOSE UR QL STRIP: NEGATIVE
KETONES UR QL STRIP: NEGATIVE
LEUKOCYTE ESTERASE UR QL STRIP: NEGATIVE
PH, POC UA: 7
POC BLOOD, URINE: NEGATIVE
POC NITRATES, URINE: NEGATIVE
POC RESIDUAL URINE VOLUME: 45 ML (ref 0–100)
PROT UR QL STRIP: NEGATIVE
SP GR UR STRIP: 1.01 (ref 1–1.03)
UROBILINOGEN UR STRIP-ACNC: 0.2 (ref 0.3–2.2)

## 2025-02-04 PROCEDURE — 1160F RVW MEDS BY RX/DR IN RCRD: CPT | Mod: CPTII,S$GLB,, | Performed by: UROLOGY

## 2025-02-04 PROCEDURE — 3008F BODY MASS INDEX DOCD: CPT | Mod: CPTII,S$GLB,, | Performed by: UROLOGY

## 2025-02-04 PROCEDURE — 99999 PR PBB SHADOW E&M-EST. PATIENT-LVL IV: CPT | Mod: PBBFAC,,, | Performed by: UROLOGY

## 2025-02-04 PROCEDURE — 51798 US URINE CAPACITY MEASURE: CPT | Mod: S$GLB,,, | Performed by: UROLOGY

## 2025-02-04 PROCEDURE — 99213 OFFICE O/P EST LOW 20 MIN: CPT | Mod: S$GLB,,, | Performed by: UROLOGY

## 2025-02-04 PROCEDURE — 1101F PT FALLS ASSESS-DOCD LE1/YR: CPT | Mod: CPTII,S$GLB,, | Performed by: UROLOGY

## 2025-02-04 PROCEDURE — 1159F MED LIST DOCD IN RCRD: CPT | Mod: CPTII,S$GLB,, | Performed by: UROLOGY

## 2025-02-04 PROCEDURE — 3078F DIAST BP <80 MM HG: CPT | Mod: CPTII,S$GLB,, | Performed by: UROLOGY

## 2025-02-04 PROCEDURE — 81003 URINALYSIS AUTO W/O SCOPE: CPT | Mod: QW,S$GLB,, | Performed by: UROLOGY

## 2025-02-04 PROCEDURE — 3074F SYST BP LT 130 MM HG: CPT | Mod: CPTII,S$GLB,, | Performed by: UROLOGY

## 2025-02-04 PROCEDURE — 1126F AMNT PAIN NOTED NONE PRSNT: CPT | Mod: CPTII,S$GLB,, | Performed by: UROLOGY

## 2025-02-04 PROCEDURE — 3288F FALL RISK ASSESSMENT DOCD: CPT | Mod: CPTII,S$GLB,, | Performed by: UROLOGY

## 2025-02-04 NOTE — PROGRESS NOTES
Chief Complaint   Patient presents with    Post-op Evaluation         History of Present Illness:     2/4/25-Currently 1 month s/p UroLift. doing okay. Had pain right after the procedure, but doing better now. Gets triggered with urgency with water running. Feels like he is emptying better.   10/15/24: 34cc prostate by ultrasound. Here for cysto.   9/24/24-Taking flomax. He reports having urgency when he is flying, which makes him nervous. Also has post-void dribble, which bothers him. Stream is somewhat weak.    9/6/23- having more urgency. Stream is weak. Taking flomax, but not working as well. No incontinence.   9/20/22-has more frequency/urgency. Previously was on flomax, and it did help. No gross hematuria or dysuria. +weak stream.   2/4/21-Pt here for follow up. Never took cialis. Not having any urinary issues. No incontinence. Stream is not particularly good. If he drinks beer in the evening, he has nocturia, but switched to bourbon and denies nocturia. Has a little stranguria when he sits to urinate, better stream when he is standing.     Past  history:   BPH and urinary urgency  Flomax caused sexual side effects  Prescribed daily cialis 2/17/20    Review of Systems:   Constitutional: Pt denies fever, chills, change in appetite or unintentional weight loss  HENT: No drooling, loss of hearing, mouth sores, facial swelling  Eyes: No photophobia, visual disturbance or eye pain  Respiratory: No cough, dyspnea, wheezing  Cardiovascular: No chest pain, palpitations or leg swelling  GI: No constipation, diarrhea, nausea, vomiting, melena or hematochezia  : No genital lesions, discharge, nocturnal enuresis  Endocrine: No polydipsia, polyphagia, heat or cold intolerance  Musculoskeletal: No joint swelling, back pain or bone pain  Integument: No color change, rash or wounds  Neurological: No seizures, speech difficulty or tremors  Psychiatric: No confusion, self-harm or Hallucinations    Current Outpatient  Medications   Medication Sig Dispense Refill    aspirin (ECOTRIN) 81 MG EC tablet Take 81 mg by mouth.      clobetasoL (TEMOVATE) 0.05 % external solution APPLY TO SCALP ONCE TO TWICE DAILY AS NEEDED FOR FLARES      fenofibrate (TRICOR) 54 MG tablet       indomethacin (INDOCIN SR) 75 mg CpSR CR capsule Take 75 mg by mouth daily as needed (pain).      losartan-hydrochlorothiazide 50-12.5 mg (HYZAAR) 50-12.5 mg per tablet       multivitamin (THERAGRAN) per tablet Take 1 tablet by mouth every morning.      omega-3 fatty acids/fish oil (FISH OIL-OMEGA-3 FATTY ACIDS) 300-1,000 mg capsule Take 2 capsules by mouth once daily.      rosuvastatin (CRESTOR) 20 MG tablet       tamsulosin (FLOMAX) 0.4 mg Cap Take 1 capsule (0.4 mg total) by mouth once daily. 90 capsule 3    testosterone (ANDROGEL) 20.25 mg/1.25 gram (1.62 %) GlPm APPLY 2 PUMPS TOPICALLY ONCE DAILY      zolpidem (AMBIEN) 10 mg Tab Take 10 mg by mouth.       No current facility-administered medications for this visit.       Review of patient's allergies indicates:  No Known Allergies    Past medical, family, and social history reviewed as documented in chart with pertinent positive medical, family, and social history detailed in HPI.    Physical Exam  Vitals:    02/04/25 0922   BP: 119/77   Pulse: 87   Resp: 18         General: Well-developed, well-nourished, in no acute distress  HEENT: Normocephalic, atraumatic, extraocular eye movements in tact  Neck: supple, trachea midline, no cervical or supraclavicular adenopathy  Respirations: Even and unlabored  Rectal: 9/6/23-30gram prostate without nodules or tenderness. No gross blood.   Extremities: Moves all extremities equally, atraumatic, no clubbing, cyanosis, edema  Skin: warm and dry, no lesions  Psych: normal affect  Neuro: Alert and oriented x 3. Cranial nerves II-XII grossly intact    PVR:45mL    Urinalysis  Negative for LE, nit, blood    PSA  10/25/23: 0.9  12/5/24: 0.8      Assessment:   1. BPH with  obstruction/lower urinary tract symptoms  POCT Urinalysis, Dipstick, Automated, W/O Scope    POCT Bladder Scan            Plan:  BPH with obstruction/lower urinary tract symptoms  -     POCT Urinalysis, Dipstick, Automated, W/O Scope  -     POCT Bladder Scan      Stop flomax in 2 weeks    Follow up in about 6 months (around 8/4/2025).

## 2025-07-18 ENCOUNTER — PATIENT MESSAGE (OUTPATIENT)
Dept: UROLOGY | Facility: CLINIC | Age: 70
End: 2025-07-18
Payer: COMMERCIAL

## (undated) DEVICE — Device

## (undated) DEVICE — LEGGING CLEAR POLY 2/PACK

## (undated) DEVICE — KIT TURNOVER

## (undated) DEVICE — SPONGE COTTON TRAY 4X4IN

## (undated) DEVICE — PROTECTOR ULNAR NERVE FOAM

## (undated) DEVICE — TUBING SUCTION STRAIGHT .25X20

## (undated) DEVICE — KIT UROLIFT 2 CARTRIDGE HANDLE

## (undated) DEVICE — SOL NORMAL USPCA 0.9%

## (undated) DEVICE — BAG DRAIN UROLOGY AND HOSE

## (undated) DEVICE — TRAY SKIN SCRUB WET PREMIUM

## (undated) DEVICE — GOWN POLY REINF BRTH SLV XL

## (undated) DEVICE — BOWL STERILE LARGE 32OZ

## (undated) DEVICE — GLOVE SURGICAL LATEX SZ 6.5

## (undated) DEVICE — DRAPE T CYSTOSCOPY STERILE

## (undated) DEVICE — JELLY LUBRICATING STERILE 2OZ

## (undated) DEVICE — COVER SURG LIGHT HANDLE

## (undated) DEVICE — SET IRR URLGY 2LINE UNIV SPIKE

## (undated) DEVICE — COVER TABLE HVY DTY 60X90IN